# Patient Record
Sex: FEMALE | URBAN - METROPOLITAN AREA
[De-identification: names, ages, dates, MRNs, and addresses within clinical notes are randomized per-mention and may not be internally consistent; named-entity substitution may affect disease eponyms.]

---

## 2018-05-29 ENCOUNTER — OFFICE VISIT (OUTPATIENT)
Dept: OBGYN CLINIC | Age: 23
End: 2018-05-29

## 2018-05-29 VITALS
WEIGHT: 122 LBS | DIASTOLIC BLOOD PRESSURE: 64 MMHG | BODY MASS INDEX: 23.95 KG/M2 | SYSTOLIC BLOOD PRESSURE: 100 MMHG | HEIGHT: 60 IN

## 2018-05-29 DIAGNOSIS — N89.8 VAGINAL ODOR: ICD-10-CM

## 2018-05-29 DIAGNOSIS — N89.8 VAGINAL DISCHARGE: ICD-10-CM

## 2018-05-29 DIAGNOSIS — Z01.419 WELL WOMAN EXAM: Primary | ICD-10-CM

## 2018-05-29 PROBLEM — N76.0 VAGINITIS: Status: ACTIVE | Noted: 2018-05-29

## 2018-05-29 LAB
BACTERIA, POC WET MOUNT: NORMAL
CLUE, POC WET MOUNT: NORMAL
RBC, POC WET MOUNT: NORMAL
TRICH, POC WET MOUNT: NORMAL
WBC, POC WET MOUNT: NORMAL
YEAST, POC WET MOUNT: NORMAL

## 2018-05-29 RX ORDER — METRONIDAZOLE 500 MG/1
500 TABLET ORAL 2 TIMES DAILY
Qty: 14 TAB | Refills: 0 | Status: SHIPPED | OUTPATIENT
Start: 2018-05-29 | End: 2018-06-05

## 2018-05-29 NOTE — PROGRESS NOTES
Annual exam    Porter Painter is a 25 y.o. [de-identified]  female presenting for annual exam. Her main concerns today include occasional vaginal discharge and odor. Denies history of yeast or bacterial vaginosis. Declines STD testing today, as she plans to get this at health dept later today due to cost. She is sexually active, had only one partner until the last couple of months, when she has had multiple sexual partners. Uses condoms most of the time. She has Mirena IUD in place (placed June 2014). She initially had some irregular bleeding/cramping with this method, but now just rare light spotting. 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 She is adopted, does not know much of her family history. Ob/Gyn Hx:  G0 -  LMP- occ spotting with IUD  Menarche- age 13  Menses- regular monthly cycles? no, Mirena IUD   Contraception- IUD  STI- HSV, cold sores only  ? SA- yes, multiple partners in past year    Health maintenance:  Pap- never  Mammo- not indicated  Colonoscopy- not indicated  Gardasil- discuss at next visit    History reviewed. No pertinent past medical history. History reviewed. No pertinent surgical history. Family History   Problem Relation Age of Onset    Adopted: Yes    Family history unknown: Yes       Social History     Social History    Marital status: SINGLE     Spouse name: N/A    Number of children: N/A    Years of education: N/A     Occupational History    Not on file. Social History Main Topics    Smoking status: Never Smoker    Smokeless tobacco: Never Used    Alcohol use No    Drug use: No    Sexual activity: Yes     Partners: Male     Birth control/ protection: IUD     Other Topics Concern    Not on file     Social History Narrative    No narrative on file       Current Outpatient Prescriptions   Medication Sig Dispense Refill    levonorgestrel (MIRENA) 20 mcg/24 hr (5 years) IUD 1 Device by IntraUTERine route once.          No Known Allergies    Review of Systems - History obtained from the patient  Constitutional: negative for weight loss, fever, night sweats  HEENT: negative for hearing loss, earache, congestion, snoring, sorethroat  CV: negative for chest pain, palpitations, edema  Resp: negative for cough, shortness of breath, wheezing  GI: negative for change in bowel habits, abdominal pain, black or bloody stools  : negative for frequency, dysuria, hematuria, +malodorous vaginal discharge  MSK: negative for back pain, joint pain, muscle pain  Breast: negative for breast lumps, nipple discharge, galactorrhea  Skin :negative for itching, rash, hives  Neuro: negative for dizziness, headache, confusion, weakness  Psych: negative for anxiety, depression, change in mood  Heme/lymph: negative for bleeding, bruising, pallor    Physical Exam    Visit Vitals    /64 (BP 1 Location: Left arm, BP Patient Position: Sitting)    Ht 5' (1.524 m)    Wt 122 lb (55.3 kg)    BMI 23.83 kg/m2       Constitutional  · Appearance: well-nourished, well developed, alert, in no acute distress    HENT  · Head and Face: appears normal    Neck  · Inspection/Palpation: normal appearance, no masses or tenderness  · Lymph Nodes: no lymphadenopathy present  · Thyroid: gland size normal, nontender, no nodules or masses present on palpation    Chest  · Respiratory Effort: non-labored breathing  · Auscultation: CTAB, normal breath sounds    Cardiovascular  · Heart:  · Auscultation: regular rate and rhythm without murmur  · Extremities: no peripheral edema    Breasts  · Inspection of Breasts: breasts symmetrical, no skin changes, no discharge present, nipple appearance normal, no skin retraction present  · Palpation of Breasts and Axillae: no masses present on palpation, no breast tenderness  · Axillary Lymph Nodes: no lymphadenopathy present    Gastrointestinal  · Abdominal Examination: abdomen non-tender to palpation, normal bowel sounds, no masses present  · Liver and spleen: no hepatomegaly present, spleen not palpable  · Hernias: no hernias identified    Genitourinary  · External Genitalia: normal appearance for age, no discharge present, no tenderness present, no inflammatory lesions present, no masses present, no atrophy present  · Vagina: normal vaginal vault without central or paravaginal defects, no discharge present, no inflammatory lesions present, no masses present  · Bladder: non-tender to palpation  · Urethra: appears normal  · Cervix: normal, IUD strings NOT visible or able to be identified with cytobrush  · Uterus: normal size, shape and consistency  · Adnexa: no adnexal tenderness present, no adnexal masses present  · Perineum: perineum within normal limits, no evidence of trauma, no rashes or skin lesions present    Skin  · General Inspection: no rash, no lesions identified    Neurologic/Psychiatric  · Mental Status:  · Orientation: grossly oriented to person, place and time  · Mood and Affect: mood normal, affect appropriate    KOH  Hypae: negative  Buds: negative    Wet Prep:  Trich: negative  Clue cells: MANY  Hyphae: negative  Buds: negative  WBC's: normal    Elevated pH    Positive Whiff      Assessment/Plan:  25 y.o. G0 presenting for annual exam. Overall doing well.      Health Maintenance:  -counseled re: diet, exercise, healthy lifestyle  -pap today  -declines STI screening --> will get this at health dept  -counseled on safe sexual practices, condoms, etc  -Gardasil to be discussed at follow up visit  -Mirena IUD strings not visible on exam today --> will schedule for [facility] for IUD check    +BV:  -Rx for flagyl 500mg BID x 7d provided, counseled no etoh with this med  -counseled on good vaginal hygiene, avoidance of douching, probiotics and rephresh okay to use, etc    RTC: 1-2 weeks for ultrasound and IUD check, and in 1 year for AE     Raul Panchal MD  5/29/2018  12:41 PM

## 2018-05-29 NOTE — MR AVS SNAPSHOT
[address] [address] [facility] 
[phone] Patient: Bravo Bolton MRN: [id number] :1995 Visit Information Date & Time Provider Department Dept. Phone Encounter #  
 2018 11:20 AM Anny Raines MD [address] [facility] [phone] [id number] Upcoming Health Maintenance Date Due  
 HPV Age 9Y-34Y (1 of 3 - Female 3 Dose Series) 2007 PAP AKA CERVICAL CYTOLOGY 2017 Influenza Age 5 to Adult 2018 Allergies as of 2018  Review Complete On: 2018 By: Henry Larson No Known Allergies Current Immunizations  Never Reviewed No immunizations on file. Not reviewed this visit You Were Diagnosed With   
  
 Codes Comments Well woman exam    -  Primary ICD-10-CM: M64.786 ICD-9-CM: V72.31 Vitals BP Height(growth percentile) Weight(growth percentile) BMI OB Status Smoking Status 100/64 (BP 1 Location: Left arm, BP Patient Position: Sitting) 5' (1.524 m) 122 lb (55.3 kg) 23.83 kg/m2 IUD Never Smoker BMI and BSA Data Body Mass Index Body Surface Area  
 23.83 kg/m 2 1.53 m 2 Your Updated Medication List  
  
   
This list is accurate as of 18 11:44 AM.  Always use your most recent med list.  
  
  
  
  
 levonorgestrel 20 mcg/24 hr (5 years) Iud  
Commonly known as:  MIRENA  
1 Device by IntraUTERine route once. We Performed the Following PAP IG, RFX APTIMA HPV ASCUS ([id number])) [[id number] Custom] Patient Instructions Pap Test: Care Instructions Your Care Instructions The Pap test (also called a Pap smear) is a screening test for cancer of the cervix, which is the lower part of the uterus that opens into the vagina. The test can help your doctor find early changes in the cells that could lead to cancer.  
The sample of cells taken during your test has been sent to a lab so that an expert can look at the cells. It usually takes a week or two to get the results back. Follow-up care is a key part of your treatment and safety. Be sure to make and go to all appointments, and call your doctor if you are having problems. It's also a good idea to know your test results and keep a list of the medicines you take. What do the results mean? · A normal result means that the test did not find any abnormal cells in the sample. · An abnormal result can mean many things. Most of these are not cancer. The results of your test may be abnormal because: 
¨ You have an infection of the vagina or cervix, such as a yeast infection. ¨ You have an IUD (intrauterine device for birth control). ¨ You have low estrogen levels after menopause that are causing the cells to change. ¨ You have cell changes that may be a sign of precancer or cancer. The results are ranked based on how serious the changes might be. There are many other reasons why you might not get a normal result. If the results were abnormal, you may need to get another test within a few weeks or months. If the results show changes that could be a sign of cancer, you may need a test called a colposcopy, which provides a more complete view of the cervix. Sometimes the lab cannot use the sample because it does not contain enough cells or was not preserved well. If so, you may need to have the test again. This is not common, but it does happen from time to time. When should you call for help? Watch closely for changes in your health, and be sure to contact your doctor if: 
? · You have vaginal bleeding or pain for more than 2 days after the test. It is normal to have a small amount of bleeding for a day or two after the test.  
Where can you learn more? Go to http://ryanne-bin.info/. Enter A727 in the search box to learn more about \"Pap Test: Care Instructions. \" Current as of: May 12, 2017 Content Version: 11.4 © 8946-4517 Healthwise[employer]. Care instructions adapted under license by [employer] (which disclaims liability or warranty for this information). If you have questions about a medical condition or this instruction, always ask your healthcare professional. [address] any warranty or liability for your use of this information. Introducing [facility]! Mery Paz introduces BigBad patient portal. Now you can access parts of your medical record, email your doctor's office, and request medication refills online. 1. In your internet browser, go to [email or web address]/[facility] 2. Click on the First Time User? Click Here link in the Sign In box. You will see the New Member Sign Up page. 3. Enter your BigBad Access Code exactly as it appears below. You will not need to use this code after youve completed the sign-up process. If you do not sign up before the expiration date, you must request a new code. · BigBad Access Code: [email or web address] Expires: 8/27/2018 11:29 AM 
 
4. Enter the last four digits of your Social Security Number (xxxx) and Date of Birth (mm/dd/yyyy) as indicated and click Submit. You will be taken to the next sign-up page. 5. Create a BigBad ID. This will be your BigBad login ID and cannot be changed, so think of one that is secure and easy to remember. 6. Create a BigBad password. You can change your password at any time. 7. Enter your Password Reset Question and Answer. This can be used at a later time if you forget your password. 8. Enter your e-mail address. You will receive e-mail notification when new information is available in [address] 9. Click Sign Up. You can now view and download portions of your medical record. 10. Click the Download Summary menu link to download a portable copy of your medical information.  
 
If you have questions, please visit the Frequently Asked Questions section of the [employer]. Remember, [employer] is NOT to be used for urgent needs. For medical emergencies, dial 911. Now available from your iPhone and Android! Please provide this summary of care documentation to your next provider. If you have any questions after today's visit, please call [phone].

## 2018-05-29 NOTE — PATIENT INSTRUCTIONS
Pap Test: Care Instructions  Your Care Instructions    The Pap test (also called a Pap smear) is a screening test for cancer of the cervix, which is the lower part of the uterus that opens into the vagina. The test can help your doctor find early changes in the cells that could lead to cancer. The sample of cells taken during your test has been sent to a lab so that an expert can look at the cells. It usually takes a week or two to get the results back. Follow-up care is a key part of your treatment and safety. Be sure to make and go to all appointments, and call your doctor if you are having problems. It's also a good idea to know your test results and keep a list of the medicines you take. What do the results mean? · A normal result means that the test did not find any abnormal cells in the sample. · An abnormal result can mean many things. Most of these are not cancer. The results of your test may be abnormal because:  ¨ You have an infection of the vagina or cervix, such as a yeast infection. ¨ You have an IUD (intrauterine device for birth control). ¨ You have low estrogen levels after menopause that are causing the cells to change. ¨ You have cell changes that may be a sign of precancer or cancer. The results are ranked based on how serious the changes might be. There are many other reasons why you might not get a normal result. If the results were abnormal, you may need to get another test within a few weeks or months. If the results show changes that could be a sign of cancer, you may need a test called a colposcopy, which provides a more complete view of the cervix. Sometimes the lab cannot use the sample because it does not contain enough cells or was not preserved well. If so, you may need to have the test again. This is not common, but it does happen from time to time. When should you call for help?   Watch closely for changes in your health, and be sure to contact your doctor if:  ? · You have vaginal bleeding or pain for more than 2 days after the test. It is normal to have a small amount of bleeding for a day or two after the test.   Where can you learn more? Go to http://ryanne-bin.info/. Enter L379 in the search box to learn more about \"Pap Test: Care Instructions. \"  Current as of: May 12, 2017  Content Version: 11.4  © 7330-1183 [employer]. Care instructions adapted under license by [employer] (which disclaims liability or warranty for this information). If you have questions about a medical condition or this instruction, always ask your healthcare professional. [address] any warranty or liability for your use of this information.

## 2018-05-31 LAB
CYTOLOGIST CVX/VAG CYTO: NORMAL
CYTOLOGY CVX/VAG DOC THIN PREP: NORMAL
DX ICD CODE: NORMAL
LABCORP, 190119: NORMAL
Lab: NORMAL
OTHER STN SPEC: NORMAL
PATH REPORT.FINAL DX SPEC: NORMAL
STAT OF ADQ CVX/VAG CYTO-IMP: NORMAL

## 2018-06-08 ENCOUNTER — OFFICE VISIT (OUTPATIENT)
Dept: OBGYN CLINIC | Age: 23
End: 2018-06-08

## 2018-06-08 VITALS
WEIGHT: 121.8 LBS | SYSTOLIC BLOOD PRESSURE: 102 MMHG | HEIGHT: 60 IN | DIASTOLIC BLOOD PRESSURE: 62 MMHG | BODY MASS INDEX: 23.91 KG/M2

## 2018-06-08 DIAGNOSIS — Z30.431 IUD CHECK UP: Primary | ICD-10-CM

## 2018-06-08 NOTE — PATIENT INSTRUCTIONS
Intrauterine Device (IUD) for Birth Control: Care Instructions  Your Care Instructions    The intrauterine device (IUD) is used to prevent pregnancy. It's a small, plastic, T-shaped device. Your doctor places the IUD in your uterus. You are using either a hormonal IUD or a copper IUD. · Hormonal IUDs prevent pregnancy for 3 to 5 years, depending on which IUD is used. Once you have it, you don't have to do anything else to prevent pregnancy. · The copper IUD prevents pregnancy for 10 years. Once you have it, you don't have to do anything to prevent pregnancy. A string tied to the end of the IUD hangs down through the opening of the uterus (called the cervix) into the vagina. You can check that the IUD is in place by feeling for the string. The IUD usually stays in the uterus until your doctor removes it. Follow-up care is a key part of your treatment and safety. Be sure to make and go to all appointments, and call your doctor if you are having problems. It's also a good idea to know your test results and keep a list of the medicines you take. How can you care for yourself at home? How do you use the IUD? · Your doctor inserts the IUD. This takes only a few minutes and can be done at your doctor's office. · Your doctor may have you feel for the IUD string right after insertion, to be sure you know what it feels like. · Check for the string after every period. ¨ Insert a finger into your vagina and feel for the cervix, which is at the top of the vagina and feels harder than the rest of your vagina (some women say it feels like the tip of your nose). ¨ You should be able to feel the thin, plastic string coming out of the opening of your cervix. If you cannot feel the string, it doesn't always mean that the IUD is out of place. Sometimes the string is just difficult to feel or has been pulled up into the cervical canal (which will not harm you).   · Your doctor may want to see you 4 to 6 weeks after the IUD insertion, to make sure it is in place. What if you think the IUD is not in place? Always read the label for specific instructions. Here are some basic guidelines:  · Call your doctor and use backup birth control, such as a condom, or don't have intercourse until you know the IUD is working. · If you have had intercourse, you can use emergency contraception, such as the morning-after pill (Plan B). You can use emergency contraception for up to 5 days after having had intercourse, but it works best if you take it right away. What else do you need to know? · The IUD has side effects. ¨ The hormonal IUD usually reduces menstrual flow and cramping over time. It can also cause spotting, mood swings, and breast tenderness. ¨ The copper IUD can cause longer and heavier periods. · After an IUD is first put in, you may have some mild cramping and light spotting for 1 to 2 days. · The IUD doesn't protect against sexually transmitted infections (STIs), such as herpes or HIV/AIDS. If you're not sure whether your sex partner might have an STI, use a condom to protect against disease. When should you call for help? Call your doctor now or seek immediate medical care if:  ? · You have pain in your belly or pelvis. ? · You have severe vaginal bleeding. This means that you are soaking through your usual pads or tampons each hour for 2 or more hours. ? · You have vaginal discharge that smells bad.   ? · You have a fever. ? Watch closely for changes in your health, and be sure to contact your doctor if you have any problems. Where can you learn more? Go to http://ryanne-bin.info/. Enter W122 in the search box to learn more about \"Intrauterine Device (IUD) for Birth Control: Care Instructions. \"  Current as of: March 16, 2017  Content Version: 11.4  © 9232-7735 [employer].  Care instructions adapted under license by [employer] (which disclaims liability or warranty for this information). If you have questions about a medical condition or this instruction, always ask your healthcare professional. [address] [address] any warranty or liability for your use of this information.

## 2018-06-08 NOTE — PROGRESS NOTES
Follow Up IUD Placement    Carmita Garrison is a 25 y.o. [de-identified]  female presenting for ultrasound and follow up of IUD placement. Her main concerns today include irregular vaginal bleeding and spotting for the past 3 weeks. She has had multiple new sexual partners. Reports she had recent STD testing at health department and is still awaiting results. Declines additional exam or testing today. She was diagnosed with BV at prior visit, but has not yet taken flagyl. Ultrasound 6/8/18  THE UTERUS IS ANTEVERTED, NORMAL IN SIZE AND ECHOGENICITY. THE IUD APPEARS CORRECTLY PLACED WITHIN THE ENDOMETRIAL LINING OF THE FUNDAL PORTION OF THE  UTERUS. RIGHT OVARY APPEARS WNL. LEFT OVARY APPEARS WNL. NO FREE FLUID IS SEEN IN THE CDS. Ob/Gyn Hx:  G0 -  LMP- occ spotting with IUD  Menarche- age 13  Menses- regular monthly cycles? no, Mirena IUD   Contraception- IUD  STI- HSV, cold sores only  ? SA- yes, multiple partners in past year    Health maintenance:  Pap- 5/29/18 NILM  Mammo- not indicated  Colonoscopy- not indicated  Gardasil- discuss at next visit    Past Medical History:   Diagnosis Date    HSV (herpes simplex virus) infection        History reviewed. No pertinent surgical history. Family History   Problem Relation Age of Onset    Adopted: Yes       Social History     Social History    Marital status: SINGLE     Spouse name: N/A    Number of children: N/A    Years of education: N/A     Occupational History    Not on file. Social History Main Topics    Smoking status: Never Smoker    Smokeless tobacco: Never Used    Alcohol use No    Drug use: No    Sexual activity: Yes     Partners: Male     Birth control/ protection: IUD     Other Topics Concern    Not on file     Social History Narrative       Current Outpatient Prescriptions   Medication Sig Dispense Refill    levonorgestrel (MIRENA) 20 mcg/24 hr (5 years) IUD 1 Device by IntraUTERine route once.          No Known Allergies    Review of Systems - History obtained from the patient  Constitutional: negative for weight loss, fever, night sweats  HEENT: negative for hearing loss, earache, congestion, snoring, sorethroat  CV: negative for chest pain, palpitations, edema  Resp: negative for cough, shortness of breath, wheezing  GI: negative for change in bowel habits, abdominal pain, black or bloody stools  : negative for frequency, dysuria, hematuria, +malodorous vaginal discharge  MSK: negative for back pain, joint pain, muscle pain  Breast: negative for breast lumps, nipple discharge, galactorrhea  Skin :negative for itching, rash, hives  Neuro: negative for dizziness, headache, confusion, weakness  Psych: negative for anxiety, depression, change in mood  Heme/lymph: negative for bleeding, bruising, pallor    Physical Exam    Visit Vitals    /62 (BP 1 Location: Left arm, BP Patient Position: Sitting)    Ht 5' (1.524 m)    Wt 121 lb 12.8 oz (55.2 kg)    BMI 23.79 kg/m2       Constitutional  · Appearance: well-nourished, well developed, alert, in no acute distress    HENT  · Head and Face: appears normal    Chest  · Respiratory Effort: non-labored breathing    Cardiovascular  · Extremities: no peripheral edema    Gastrointestinal  · Abdominal Examination: abdomen non-tender to palpation, normal bowel sounds, no masses present  · Liver and spleen: no hepatomegaly present, spleen not palpable  · Hernias: no hernias identified    Neurologic/Psychiatric  · Mental Status:  · Orientation: grossly oriented to person, place and time  · Mood and Affect: mood normal, affect appropriate      Assessment/Plan:  25 y.o. G0 presenting for IUD check.     -reviewed ultrasound results with patient today confirming IUD in proper position within uterus  -declines STI screening --> as this was recently done at outside clinic and she is awaiting results  -counseled on safe sexual practices, condoms, etc  -encouraged her to take flagyl as prescribed previously for treatment of BV    RTC: in 1 year for AE, or sooner prn    Pankaj Garcia MD  6/8/2018  9:29 AM

## 2018-07-27 ENCOUNTER — OFFICE VISIT (OUTPATIENT)
Dept: OBGYN CLINIC | Age: 23
End: 2018-07-27

## 2018-07-27 VITALS
DIASTOLIC BLOOD PRESSURE: 62 MMHG | HEIGHT: 60 IN | SYSTOLIC BLOOD PRESSURE: 98 MMHG | RESPIRATION RATE: 16 BRPM | BODY MASS INDEX: 23.87 KG/M2 | WEIGHT: 121.6 LBS

## 2018-07-27 DIAGNOSIS — N92.6 IRREGULAR MENSES: Primary | ICD-10-CM

## 2018-07-27 RX ORDER — NORGESTIMATE AND ETHINYL ESTRADIOL 0.25-0.035
1 KIT ORAL DAILY
Qty: 1 DOSE PACK | Refills: 0 | Status: SHIPPED | OUTPATIENT
Start: 2018-07-27 | End: 2018-08-24

## 2018-07-27 NOTE — PROGRESS NOTES
Janice Pablo is a 26 y.o. female Chief Complaint Patient presents with  Vaginal Bleeding 1. Have you been to the ER, urgent care clinic since your last visit? Hospitalized since your last visit? No  
 
2. Have you seen or consulted any other health care providers outside of the [facility] since your last visit? Include any pap smears or colon screening. No  
 
Visit Vitals  BP 98/62  Resp 16  
 Ht 5' (1.524 m)  Wt 121 lb 9.6 oz (55.2 kg)  BMI 23.75 kg/m2 Abnormal Uterine Bleeding Janice Pablo is a 26 y.o. female presenting for evaluation of AUB. She developed this problem approximately a few months ago. Current episode of bleeding after intercourse lasting up to a few days. Previously regular monthly cycles? no 
 
Cycles typically last long periods . Intermenstrual spotting/bleeding? yes Pad or tampon count: changes every 4 hours. Passage of large clots? no 
 
Flooding? no 
 
Associated symptoms include none. CP, SOB, dizziness, weakness or fatigue? no 
 
Signs of excess androgens (ie-unwanted hair growth, acne, etc)? Alleviating factors: none Aggravating factors: none History of anemia? no 
 
Prior transfusions? {Yes No:92933} Ob/Gyn Hx: 
G P A - 
LMP- 
Menses- as per HPI Contraception- 
STI- ? SA- Health maintenance: 
Pap- 
Gardasil- 
Mammo- 
Colonoscopy-  
Dexa- 
  
 
Past Medical History:  
Diagnosis Date  HSV (herpes simplex virus) infection History reviewed. No pertinent surgical history. Family History Problem Relation Age of Onset  Adopted: Yes  
 
 
Social History Social History  Marital status: SINGLE Spouse name: N/A  
 Number of children: N/A  
 Years of education: N/A Occupational History  Not on file. Social History Main Topics  Smoking status: Never Smoker  Smokeless tobacco: Never Used  Alcohol use No  
 Drug use: No  
 Sexual activity: Yes  
  Partners: Male Birth control/ protection: IUD Other Topics Concern  Not on file Social History Narrative Prior to Admission medications Medication Sig Start Date End Date Taking? Authorizing Provider  
levonorgestrel (MIRENA) 20 mcg/24 hr (5 years) IUD 1 Device by IntraUTERine route once. Yes Historical Provider No Known Allergies Review of Systems - History obtained from the patient Constitutional: negative for weight loss, fever, night sweats HEENT: negative for hearing loss, earache, congestion, snoring, sorethroat CV: negative for chest pain, palpitations, edema Resp: negative for cough, shortness of breath, wheezing Breast: negative for breast lumps, nipple discharge, galactorrhea GI: negative for change in bowel habits, abdominal pain, black or bloody stools : negative for frequency, dysuria, hematuria, +vaginal bleeding, as per HPI 
MSK: negative for back pain, joint pain, muscle pain Skin: negative for itching, rash, hives Neuro: negative for dizziness, headache, confusion, weakness Psych: negative for anxiety, depression, change in mood Heme/lymph: negative for bleeding, bruising, pallor Objective: 
 
Visit Vitals  BP 98/62  Resp 16  
 Ht 5' (1.524 m)  Wt 121 lb 9.6 oz (55.2 kg)  BMI 23.75 kg/m2 PHYSICAL EXAMINATION Constitutional 
· Appearance: well-nourished, well developed, alert, in no acute distress HENT 
· Head and Face: appears normal 
 
Gastrointestinal 
· Abdominal Examination: abdomen non-tender to palpation, normal bowel sounds, no masses present · Liver and spleen: no hepatomegaly present, spleen not palpable · Hernias: no hernias identified Genitourinary · External Genitalia: normal appearance for age, no discharge present, no tenderness present, no inflammatory lesions present, no masses present, no atrophy present · Vagina: normal vaginal vault without central or paravaginal defects, no discharge present, no inflammatory lesions present, no masses present, {Numbers 1-15,20,30:[id number]} scopettes of blood in vault · Bladder: non-tender to palpation · Urethra: appears normal 
· Cervix: normal  
· Uterus: normal size, shape and consistency, mobile · Adnexa: no adnexal tenderness present, no adnexal masses present · Perineum: perineum within normal limits, no evidence of trauma, no rashes or skin lesions present Skin · General Inspection: no rash, no lesions identified Neurologic/Psychiatric · Mental Status: · Orientation: grossly oriented to person, place and time · Mood and Affect: mood normal, affect appropriate No results found for this or any previous visit (from the past 24 hour(s)). UPT - Assessment/Plan:  
Ibis Melgar is a 26 y.o. female presenting for evaluation of AUB. -menstrual calendar 
-pad/tampon counts 
-CBC, TSH, PRL, GC/Chl, PCOS labs 
-refer for TVUS/SIS  
-NSAIDs scheduled with menses 
-advised iron rich diet and OTC iron supplementation BID 
-Tx: 
-EMBx done today, surgical pathology pending, will contact pt with results 
-reviewed bleeding precautions and reasons to come into ER (bleeding >1-2 pads/tampons per hour, associated dizziness, SOB, CP, fever, etc) RTC: in , or sooner for any problems or concerns 
-instructions and handouts given to patient Romelia Mcgarry MA  7/27/2018  11:14 AM

## 2018-07-27 NOTE — PROGRESS NOTES
Irregular bleeding with IUD    Nasir Curiel is a 26 y.o. [de-identified]  female presenting for evaluation of irregular vaginal bleeding with Mirena IUD (placed June 2014). Prior ultrasound confirming IUD in appropriate location. Pt irregular spotting, particularly notices it after intercourse, but also randomly. Recently with multiple new sexual partners. [facility], and declines repeat testing today. Just wants to make sure the irregular bleeding is normal. Recently treated for BV with flagyl, no improvement in vaginal bleeding symptoms. Ultrasound 6/8/18  THE UTERUS IS ANTEVERTED, NORMAL IN SIZE AND ECHOGENICITY. THE IUD APPEARS CORRECTLY PLACED WITHIN THE ENDOMETRIAL LINING OF THE FUNDAL PORTION OF THE  UTERUS. RIGHT OVARY APPEARS WNL. LEFT OVARY APPEARS WNL. NO FREE FLUID IS SEEN IN THE CDS. Ob/Gyn Hx:  G0 -  LMP- occ spotting with IUD  Menarche- age 13  Menses- regular monthly cycles? no, Mirena IUD   Contraception- IUD  STI- HSV, cold sores only  ? SA- yes, multiple partners in past year    Health maintenance:  Pap- 5/29/18 NILM  Gardasil- recommended today, pt unsure if she has ever had this    Past Medical History:   Diagnosis Date    HSV (herpes simplex virus) infection        History reviewed. No pertinent surgical history. Family History   Problem Relation Age of Onset    Adopted: Yes       Social History     Social History    Marital status: SINGLE     Spouse name: N/A    Number of children: N/A    Years of education: N/A     Occupational History    Not on file.      Social History Main Topics    Smoking status: Never Smoker    Smokeless tobacco: Never Used    Alcohol use No    Drug use: No    Sexual activity: Yes     Partners: Male     Birth control/ protection: IUD     Other Topics Concern    Not on file     Social History Narrative       Current Outpatient Prescriptions   Medication Sig Dispense Refill    levonorgestrel (MIRENA) 20 mcg/24 hr (5 years) IUD 1 Device by IntraUTERine route once.          No Known Allergies    Review of Systems - History obtained from the patient  Constitutional: negative for weight loss, fever, night sweats  HEENT: negative for hearing loss, earache, congestion, snoring, sorethroat  CV: negative for chest pain, palpitations, edema  Resp: negative for cough, shortness of breath, wheezing  GI: negative for change in bowel habits, abdominal pain, black or bloody stools  : negative for frequency, dysuria, hematuria, +irregular vaginal bleeding/spotting  MSK: negative for back pain, joint pain, muscle pain  Breast: negative for breast lumps, nipple discharge, galactorrhea  Skin :negative for itching, rash, hives  Neuro: negative for dizziness, headache, confusion, weakness  Psych: negative for anxiety, depression, change in mood  Heme/lymph: negative for bleeding, bruising, pallor    Physical Exam    Visit Vitals    BP 98/62    Resp 16    Ht 5' (1.524 m)    Wt 121 lb 9.6 oz (55.2 kg)    BMI 23.75 kg/m2       Constitutional  · Appearance: well-nourished, well developed, alert, in no acute distress    HENT  · Head and Face: appears normal    Chest  · Respiratory Effort: non-labored breathing    Cardiovascular  · Extremities: no peripheral edema    Gastrointestinal  · Abdominal Examination: abdomen non-tender to palpation, normal bowel sounds, no masses present  · Liver and spleen: no hepatomegaly present, spleen not palpable  · Hernias: no hernias identified    Gynecologic exam: normal vaginal vault, cervix normal appearing, no cervicitis, no CMT, IUD strings not visible, bimanual exam unremarkable    Neurologic/Psychiatric  · Mental Status:  · Orientation: grossly oriented to person, place and time  · Mood and Affect: mood normal, affect appropriate    KOH  Hypae: negative  Buds: negative    Wet Prep:  Trich: negative  Clue cells: negative  Hyphae: negative  Buds: negative  WBC's: normal    Assessment/Plan:  26 y.o. G0 presenting for evaluation of irregular vaginal spotting. Likely BTB with Mirena IUD.     -wet prep wnl today  -trial of add-back estrogen with 1 month supply of OCPs (sprintec)  -gardasil recommended  -declines STI screening --> as this was recently done at outside clinic  -counseled on safe sexual practices, condoms, etc    RTC: in 1 year for AE, or sooner aydin Rodrigez MD  7/27/2018  9:29 AM